# Patient Record
(demographics unavailable — no encounter records)

---

## 2025-02-21 NOTE — ASSESSMENT
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 Switch to recommended products in handout and moisturize liberally. recommend applying liberal amounts of plain vaseline to cheeks, especially before and after meals avoid wipes to the face after meals can cleanse with water and pat dry with a clean towel/cloth - ok to use alclometasone ointment BID to AA of rough skin on face PRN roughness, SED - can start eucrisa to any minimally rough areas if covered, SED including initial stinging sensation, if not covered could also try tacrolimus  favor Carotenemia, based on current diet no signs of jaundice no concern at this time for systemic disease discussed benign nature and course of condition can try adjusting diet to decrease ratio of foods high in beta carotene   RTC 3 mo

## 2025-02-21 NOTE — HISTORY OF PRESENT ILLNESS
[FreeTextEntry1] : np rashes [de-identified] : Referred by: Dr. العلي   Mr. ROBIN BENSON  is a 14 month old M here for evaluation of below  #always had irritation on cheeks. when started eating the inflammation got worse. mom also noticing an orange discoloration over the past few months on cheeks , hands, feet tried using alclometasone and mupirocin which helped  mom purees food - bananas, pears and apples mainly. also store bought organic fruit packs - banana apples pear +strawberry/blackberry/blueberry veggies: predominently purred carrots, sw pot, butternut squash. occ spinach, broccoli separately eats organic william green beans, corn, peas also eats oatmeal and yogurt protein intake: purreed beef stew, chicken noodle soup, meatball (sometimes includes tomato sauce) milk: 3 bottles of whole milk + water a day rice cake/puffs as snacks  S: cerave M: none D: dreft, +wb. no fs/ds/fb   patient is healthy, developing normally, meeting milestones appropriately stools are well formed weight curve is appropriate per mom's report no personal or family h/o skin cancer

## 2025-02-21 NOTE — CONSULT LETTER
[Dear  ___] : Dear  [unfilled], [Consult Letter:] : I had the pleasure of evaluating your patient, [unfilled]. [Please see my note below.] : Please see my note below. [Consult Closing:] : Thank you very much for allowing me to participate in the care of this patient.  If you have any questions, please do not hesitate to contact me. [Sincerely,] : Sincerely, [FreeTextEntry3] : Antonia Bradford MD Department of Dermatology [facility]

## 2025-02-21 NOTE — CONSULT LETTER
[Dear  ___] : Dear  [unfilled], [Consult Letter:] : I had the pleasure of evaluating your patient, [unfilled]. [Please see my note below.] : Please see my note below. [Consult Closing:] : Thank you very much for allowing me to participate in the care of this patient.  If you have any questions, please do not hesitate to contact me. [Sincerely,] : Sincerely, [FreeTextEntry3] : Antonia Bradford MD Department of Dermatology [facility]

## 2025-02-21 NOTE — PHYSICAL EXAM
[Alert] : alert [FreeTextEntry3] : eczematous patches on cheeks, xerosis on body +orange discoloration on cheeks, palms, soles sclera are not yellowed

## 2025-02-21 NOTE — HISTORY OF PRESENT ILLNESS
[FreeTextEntry1] : np rashes [de-identified] : Referred by: Dr. العلي   Mr. ROBIN BENSON  is a 14 month old M here for evaluation of below  #always had irritation on cheeks. when started eating the inflammation got worse. mom also noticing an orange discoloration over the past few months on cheeks , hands, feet tried using alclometasone and mupirocin which helped  mom purees food - bananas, pears and apples mainly. also store bought organic fruit packs - banana apples pear +strawberry/blackberry/blueberry veggies: predominently purred carrots, sw pot, butternut squash. occ spinach, broccoli separately eats organic william green beans, corn, peas also eats oatmeal and yogurt protein intake: purreed beef stew, chicken noodle soup, meatball (sometimes includes tomato sauce) milk: 3 bottles of whole milk + water a day rice cake/puffs as snacks  S: cerave M: none D: dreft, +wb. no fs/ds/fb   patient is healthy, developing normally, meeting milestones appropriately stools are well formed weight curve is appropriate per mom's report no personal or family h/o skin cancer

## 2025-05-23 NOTE — PHYSICAL EXAM
[Alert] : alert [Well Nourished] : well nourished [Conjunctiva Non-injected] : conjunctiva non-injected [No Visual Lymphadenopathy] : no visual  lymphadenopathy [No Clubbing] : no clubbing [No Edema] : no edema [No Bromhidrosis] : no bromhidrosis [No Chromhidrosis] : no chromhidrosis [FreeTextEntry3] : red rough patches on bl cheeks xerosis skin appears a normal color

## 2025-05-23 NOTE — HISTORY OF PRESENT ILLNESS
[FreeTextEntry1] : RPV: dry skin and yellowing of skin  [de-identified] : Referred by: Dr. العلي Here with: Mom   Mr. ROBIN BENSON  is a 17 month old M here for evaluation of below # dry skin and irritation on cheeks and legs. Prev helped with alclometasone but now mom is out. Mom reports hx of impetiginized resolved with mupirocin. Pt uses a pacifier and often rubs things on his face. # Concern for carotenemia - LV with Dr. Bradford mom reported noticing an orange discoloration over the past few months on cheeks , hands, feet. Diet was rich in pureed carrots, sw pot, butternut squash  S: Dove sensitive skin liquid soap M: Cerave lotion D: dreft, +wb. no fs/ds/fb   patient is healthy, developing normally, meeting milestones appropriately stools are well formed weight curve is appropriate per mom's report no personal or family h/o skin cancer

## 2025-05-23 NOTE — CONSULT LETTER
[Dear  ___] : Dear  [unfilled], [Consult Letter:] : I had the pleasure of evaluating your patient, [unfilled]. [Please see my note below.] : Please see my note below. [Consult Closing:] : Thank you very much for allowing me to participate in the care of this patient.  If you have any questions, please do not hesitate to contact me. [Sincerely,] : Sincerely, [FreeTextEntry3] : Michelle Bradley MD Department of Dermatology [facility]

## 2025-05-23 NOTE — ASSESSMENT
[Use of independent historian: [ enter independent historian's relationship to patient ] :____] : As the patient was unable to provide a complete and reliable history, I obtained clinical history from the patient's [unfilled] [FreeTextEntry1] : #AD, face>body, mild #xerosis discussed nature, chronicity and unpredictable course dry skin care reviewed, handout provided. Switch to recommended products in handout and moisturize liberally. recommend applying liberal amounts of plain vaseline to cheeks, especially before and after meals avoid wipes to the face after meals can cleanse with water and pat dry with a clean towel/cloth - If needed, can use hydrocortisone 2.5%ointment BID to rough areas on face as neeed for roughness  - START eucrisa to prior hot spots BID for 1-2 weeks, then space out to 1-2 x/week for maintenance  Prior suspicion for carotenemia No evidence of this today on exam Can request pediatrician check on 1yo blood work    RTC PRN

## 2025-05-23 NOTE — HISTORY OF PRESENT ILLNESS
[FreeTextEntry1] : RPV: dry skin and yellowing of skin  [de-identified] : Referred by: Dr. العلي Here with: Mom   Mr. ROBIN BENSON  is a 17 month old M here for evaluation of below # dry skin and irritation on cheeks and legs. Prev helped with alclometasone but now mom is out. Mom reports hx of impetiginized resolved with mupirocin. Pt uses a pacifier and often rubs things on his face. # Concern for carotenemia - LV with Dr. Bradford mom reported noticing an orange discoloration over the past few months on cheeks , hands, feet. Diet was rich in pureed carrots, sw pot, butternut squash  S: Dove sensitive skin liquid soap M: Cerave lotion D: dreft, +wb. no fs/ds/fb   patient is healthy, developing normally, meeting milestones appropriately stools are well formed weight curve is appropriate per mom's report no personal or family h/o skin cancer

## 2025-05-23 NOTE — CONSULT LETTER
[Dear  ___] : Dear  [unfilled], [Consult Letter:] : I had the pleasure of evaluating your patient, [unfilled]. [Please see my note below.] : Please see my note below. [Consult Closing:] : Thank you very much for allowing me to participate in the care of this patient.  If you have any questions, please do not hesitate to contact me. [Sincerely,] : Sincerely, [FreeTextEntry3] : Michelle Bradley MD Department of Dermatology [facility]